# Patient Record
Sex: MALE | Race: WHITE | Employment: FULL TIME | URBAN - METROPOLITAN AREA
[De-identification: names, ages, dates, MRNs, and addresses within clinical notes are randomized per-mention and may not be internally consistent; named-entity substitution may affect disease eponyms.]

---

## 2022-12-02 RX ORDER — FLUTICASONE FUROATE AND VILANTEROL 200; 25 UG/1; UG/1
1 POWDER RESPIRATORY (INHALATION) DAILY
COMMUNITY

## 2022-12-02 RX ORDER — FUROSEMIDE 40 MG/1
40 TABLET ORAL DAILY
COMMUNITY

## 2022-12-02 RX ORDER — ROSUVASTATIN CALCIUM 20 MG/1
20 TABLET, COATED ORAL DAILY
COMMUNITY

## 2022-12-02 RX ORDER — SEMAGLUTIDE 2.68 MG/ML
2 INJECTION, SOLUTION SUBCUTANEOUS
COMMUNITY

## 2022-12-02 RX ORDER — OLMESARTAN MEDOXOMIL 40 MG/1
40 TABLET ORAL DAILY
COMMUNITY

## 2022-12-02 RX ORDER — ASPIRIN 81 MG/1
81 TABLET ORAL DAILY
COMMUNITY

## 2022-12-02 RX ORDER — PIOGLITAZONEHYDROCHLORIDE 30 MG/1
30 TABLET ORAL DAILY
COMMUNITY

## 2022-12-02 RX ORDER — AMLODIPINE BESYLATE 5 MG/1
5 TABLET ORAL DAILY
COMMUNITY

## 2022-12-02 NOTE — PERIOP NOTE
Patient verified name and . Order for consent not found in EHR. Type 1B surgery, PAT phone assessment complete. Orders not received. Labs per surgeon: no orders received. Labs per anesthesia protocol: K+ DOS- pt states he is unable to come prior to surgery for lab work. Patient answered medical/surgical history questions at their best of ability. All prior to admission medications documented in [facility]. Patient instructed to take the following medications the day of surgery according to anesthesia guidelines with a small sip of water: amlodipine and breo inhaler. On the day before surgery please take Acetaminophen 1000mg in the morning and then again before bed. You may substitute for Tylenol 650 mg. Hold all vitamins 7 days prior to surgery and NSAIDS 5 days prior to surgery. Patient instructed on the following:    > Arrive at A Entrance, time of arrival to be called the day before by 1700  > NPO after midnight, unless otherwise indicated, including gum, mints, and ice chips  > Responsible adult must drive patient to the hospital, stay during surgery, and patient will need supervision 24 hours after anesthesia  > Use antibacterial soap in shower the night before surgery and on the morning of surgery  > All piercings must be removed prior to arrival.    > Leave all valuables (money and jewelry) at home but bring insurance card and ID on DOS.   > You may be required to pay a deductible or co-pay on the day of your procedure. You can pre-pay by calling [phone] if your surgery is at the [facility] or [phone] if your surgery is at the [facility]. > Do not wear make-up, nail polish, lotions, cologne, perfumes, powders, or oil on skin. Artificial nails are not permitted.

## 2022-12-09 ENCOUNTER — ANESTHESIA (OUTPATIENT)
Dept: SURGERY | Age: 52
End: 2022-12-09
Payer: COMMERCIAL

## 2022-12-09 ENCOUNTER — HOSPITAL ENCOUNTER (OUTPATIENT)
Age: 52
Setting detail: OUTPATIENT SURGERY
Discharge: HOME | End: 2022-12-09
Attending: PODIATRIST | Admitting: PODIATRIST
Payer: COMMERCIAL

## 2022-12-09 ENCOUNTER — ANESTHESIA EVENT (OUTPATIENT)
Dept: SURGERY | Age: 52
End: 2022-12-09
Payer: COMMERCIAL

## 2022-12-09 ENCOUNTER — APPOINTMENT (OUTPATIENT)
Dept: GENERAL RADIOLOGY | Age: 52
End: 2022-12-09
Attending: PODIATRIST
Payer: COMMERCIAL

## 2022-12-09 VITALS
RESPIRATION RATE: 16 BRPM | TEMPERATURE: 99 F | BODY MASS INDEX: 44.51 KG/M2 | HEART RATE: 92 BPM | HEIGHT: 70 IN | WEIGHT: 310.9 LBS | OXYGEN SATURATION: 96 % | SYSTOLIC BLOOD PRESSURE: 110 MMHG | DIASTOLIC BLOOD PRESSURE: 59 MMHG

## 2022-12-09 LAB
GLUCOSE BLD STRIP.AUTO-MCNC: 91 MG/DL (ref 65–100)
POTASSIUM SERPL-SCNC: 4 MMOL/L (ref 3.5–5.1)
SERVICE CMNT-IMP: NORMAL

## 2022-12-09 PROCEDURE — 7100000010 HC PHASE II RECOVERY - FIRST 15 MIN: Performed by: PODIATRIST

## 2022-12-09 PROCEDURE — 6360000002 HC RX W HCPCS: Performed by: NURSE ANESTHETIST, CERTIFIED REGISTERED

## 2022-12-09 PROCEDURE — 3700000000 HC ANESTHESIA ATTENDED CARE: Performed by: PODIATRIST

## 2022-12-09 PROCEDURE — 2500000003 HC RX 250 WO HCPCS: Performed by: NURSE ANESTHETIST, CERTIFIED REGISTERED

## 2022-12-09 PROCEDURE — 7100000011 HC PHASE II RECOVERY - ADDTL 15 MIN: Performed by: PODIATRIST

## 2022-12-09 PROCEDURE — 7100000000 HC PACU RECOVERY - FIRST 15 MIN: Performed by: PODIATRIST

## 2022-12-09 PROCEDURE — 2500000003 HC RX 250 WO HCPCS: Performed by: PODIATRIST

## 2022-12-09 PROCEDURE — 3600000004 HC SURGERY LEVEL 4 BASE: Performed by: PODIATRIST

## 2022-12-09 PROCEDURE — 2580000003 HC RX 258: Performed by: NURSE ANESTHETIST, CERTIFIED REGISTERED

## 2022-12-09 PROCEDURE — 2709999900 HC NON-CHARGEABLE SUPPLY: Performed by: PODIATRIST

## 2022-12-09 PROCEDURE — 6370000000 HC RX 637 (ALT 250 FOR IP): Performed by: ANESTHESIOLOGY

## 2022-12-09 PROCEDURE — 3600000014 HC SURGERY LEVEL 4 ADDTL 15MIN: Performed by: PODIATRIST

## 2022-12-09 PROCEDURE — 6360000002 HC RX W HCPCS: Performed by: PODIATRIST

## 2022-12-09 PROCEDURE — 73620 X-RAY EXAM OF FOOT: CPT

## 2022-12-09 PROCEDURE — 84132 ASSAY OF SERUM POTASSIUM: CPT

## 2022-12-09 PROCEDURE — 82962 GLUCOSE BLOOD TEST: CPT

## 2022-12-09 PROCEDURE — C1713 ANCHOR/SCREW BN/BN,TIS/BN: HCPCS | Performed by: PODIATRIST

## 2022-12-09 PROCEDURE — 2720000010 HC SURG SUPPLY STERILE: Performed by: PODIATRIST

## 2022-12-09 PROCEDURE — 7100000001 HC PACU RECOVERY - ADDTL 15 MIN: Performed by: PODIATRIST

## 2022-12-09 PROCEDURE — 3700000001 HC ADD 15 MINUTES (ANESTHESIA): Performed by: PODIATRIST

## 2022-12-09 RX ORDER — PROPOFOL 10 MG/ML
INJECTION, EMULSION INTRAVENOUS PRN
Status: DISCONTINUED | OUTPATIENT
Start: 2022-12-09 | End: 2022-12-09 | Stop reason: SDUPTHER

## 2022-12-09 RX ORDER — SODIUM CHLORIDE, SODIUM LACTATE, POTASSIUM CHLORIDE, CALCIUM CHLORIDE 600; 310; 30; 20 MG/100ML; MG/100ML; MG/100ML; MG/100ML
INJECTION, SOLUTION INTRAVENOUS CONTINUOUS
Status: DISCONTINUED | OUTPATIENT
Start: 2022-12-09 | End: 2022-12-09 | Stop reason: HOSPADM

## 2022-12-09 RX ORDER — EPHEDRINE SULFATE/0.9% NACL/PF 50 MG/5 ML
SYRINGE (ML) INTRAVENOUS PRN
Status: DISCONTINUED | OUTPATIENT
Start: 2022-12-09 | End: 2022-12-09 | Stop reason: SDUPTHER

## 2022-12-09 RX ORDER — SODIUM CHLORIDE 9 MG/ML
INJECTION, SOLUTION INTRAVENOUS PRN
Status: DISCONTINUED | OUTPATIENT
Start: 2022-12-09 | End: 2022-12-09 | Stop reason: HOSPADM

## 2022-12-09 RX ORDER — OXYCODONE HYDROCHLORIDE 5 MG/1
5 TABLET ORAL
Status: DISCONTINUED | OUTPATIENT
Start: 2022-12-09 | End: 2022-12-09 | Stop reason: HOSPADM

## 2022-12-09 RX ORDER — SODIUM CHLORIDE 0.9 % (FLUSH) 0.9 %
5-40 SYRINGE (ML) INJECTION PRN
Status: DISCONTINUED | OUTPATIENT
Start: 2022-12-09 | End: 2022-12-09 | Stop reason: HOSPADM

## 2022-12-09 RX ORDER — DEXAMETHASONE SODIUM PHOSPHATE 10 MG/ML
INJECTION INTRAMUSCULAR; INTRAVENOUS PRN
Status: DISCONTINUED | OUTPATIENT
Start: 2022-12-09 | End: 2022-12-09 | Stop reason: SDUPTHER

## 2022-12-09 RX ORDER — OXYCODONE HYDROCHLORIDE 5 MG/1
5 TABLET ORAL ONCE
Status: COMPLETED | OUTPATIENT
Start: 2022-12-09 | End: 2022-12-09

## 2022-12-09 RX ORDER — LIDOCAINE HYDROCHLORIDE 20 MG/ML
INJECTION, SOLUTION EPIDURAL; INFILTRATION; INTRACAUDAL; PERINEURAL PRN
Status: DISCONTINUED | OUTPATIENT
Start: 2022-12-09 | End: 2022-12-09 | Stop reason: SDUPTHER

## 2022-12-09 RX ORDER — ROCURONIUM BROMIDE 10 MG/ML
INJECTION, SOLUTION INTRAVENOUS PRN
Status: DISCONTINUED | OUTPATIENT
Start: 2022-12-09 | End: 2022-12-09 | Stop reason: SDUPTHER

## 2022-12-09 RX ORDER — SUCCINYLCHOLINE/SOD CL,ISO/PF 200MG/10ML
SYRINGE (ML) INTRAVENOUS PRN
Status: DISCONTINUED | OUTPATIENT
Start: 2022-12-09 | End: 2022-12-09 | Stop reason: SDUPTHER

## 2022-12-09 RX ORDER — FENTANYL CITRATE 50 UG/ML
INJECTION, SOLUTION INTRAMUSCULAR; INTRAVENOUS PRN
Status: DISCONTINUED | OUTPATIENT
Start: 2022-12-09 | End: 2022-12-09 | Stop reason: SDUPTHER

## 2022-12-09 RX ORDER — GLYCOPYRROLATE 0.2 MG/ML
INJECTION INTRAMUSCULAR; INTRAVENOUS PRN
Status: DISCONTINUED | OUTPATIENT
Start: 2022-12-09 | End: 2022-12-09 | Stop reason: SDUPTHER

## 2022-12-09 RX ORDER — ONDANSETRON 2 MG/ML
4 INJECTION INTRAMUSCULAR; INTRAVENOUS
Status: DISCONTINUED | OUTPATIENT
Start: 2022-12-09 | End: 2022-12-09 | Stop reason: HOSPADM

## 2022-12-09 RX ORDER — SODIUM CHLORIDE, SODIUM LACTATE, POTASSIUM CHLORIDE, CALCIUM CHLORIDE 600; 310; 30; 20 MG/100ML; MG/100ML; MG/100ML; MG/100ML
INJECTION, SOLUTION INTRAVENOUS CONTINUOUS PRN
Status: DISCONTINUED | OUTPATIENT
Start: 2022-12-09 | End: 2022-12-09 | Stop reason: SDUPTHER

## 2022-12-09 RX ORDER — HYDROMORPHONE HYDROCHLORIDE 1 MG/ML
0.25 INJECTION, SOLUTION INTRAMUSCULAR; INTRAVENOUS; SUBCUTANEOUS EVERY 5 MIN PRN
Status: DISCONTINUED | OUTPATIENT
Start: 2022-12-09 | End: 2022-12-09 | Stop reason: HOSPADM

## 2022-12-09 RX ORDER — DEXTROSE MONOHYDRATE 100 MG/ML
INJECTION, SOLUTION INTRAVENOUS CONTINUOUS PRN
Status: DISCONTINUED | OUTPATIENT
Start: 2022-12-09 | End: 2022-12-09 | Stop reason: HOSPADM

## 2022-12-09 RX ORDER — HALOPERIDOL 5 MG/ML
1 INJECTION INTRAMUSCULAR
Status: DISCONTINUED | OUTPATIENT
Start: 2022-12-09 | End: 2022-12-09 | Stop reason: HOSPADM

## 2022-12-09 RX ORDER — SODIUM CHLORIDE 0.9 % (FLUSH) 0.9 %
5-40 SYRINGE (ML) INJECTION EVERY 12 HOURS SCHEDULED
Status: DISCONTINUED | OUTPATIENT
Start: 2022-12-09 | End: 2022-12-09 | Stop reason: HOSPADM

## 2022-12-09 RX ORDER — BUPIVACAINE HYDROCHLORIDE 5 MG/ML
INJECTION, SOLUTION EPIDURAL; INTRACAUDAL PRN
Status: DISCONTINUED | OUTPATIENT
Start: 2022-12-09 | End: 2022-12-09 | Stop reason: HOSPADM

## 2022-12-09 RX ORDER — ONDANSETRON 2 MG/ML
INJECTION INTRAMUSCULAR; INTRAVENOUS PRN
Status: DISCONTINUED | OUTPATIENT
Start: 2022-12-09 | End: 2022-12-09 | Stop reason: SDUPTHER

## 2022-12-09 RX ADMIN — ONDANSETRON 4 MG: 2 INJECTION INTRAMUSCULAR; INTRAVENOUS at 16:11

## 2022-12-09 RX ADMIN — FENTANYL CITRATE 25 MCG: 50 INJECTION, SOLUTION INTRAMUSCULAR; INTRAVENOUS at 16:17

## 2022-12-09 RX ADMIN — ROCURONIUM BROMIDE 6 MG: 50 INJECTION, SOLUTION INTRAVENOUS at 15:46

## 2022-12-09 RX ADMIN — Medication 10 MG: at 16:04

## 2022-12-09 RX ADMIN — OXYCODONE 5 MG: 5 TABLET ORAL at 18:11

## 2022-12-09 RX ADMIN — GLYCOPYRROLATE 0.1 MG: 0.2 INJECTION, SOLUTION INTRAMUSCULAR; INTRAVENOUS at 16:11

## 2022-12-09 RX ADMIN — PHENYLEPHRINE HYDROCHLORIDE 100 MCG: 0.1 INJECTION, SOLUTION INTRAVENOUS at 16:29

## 2022-12-09 RX ADMIN — PHENYLEPHRINE HYDROCHLORIDE 100 MCG: 0.1 INJECTION, SOLUTION INTRAVENOUS at 16:00

## 2022-12-09 RX ADMIN — PHENYLEPHRINE HYDROCHLORIDE 100 MCG: 0.1 INJECTION, SOLUTION INTRAVENOUS at 16:04

## 2022-12-09 RX ADMIN — FENTANYL CITRATE 50 MCG: 50 INJECTION, SOLUTION INTRAMUSCULAR; INTRAVENOUS at 15:45

## 2022-12-09 RX ADMIN — PHENYLEPHRINE HYDROCHLORIDE 100 MCG: 0.1 INJECTION, SOLUTION INTRAVENOUS at 17:05

## 2022-12-09 RX ADMIN — PHENYLEPHRINE HYDROCHLORIDE 100 MCG: 0.1 INJECTION, SOLUTION INTRAVENOUS at 16:18

## 2022-12-09 RX ADMIN — PHENYLEPHRINE HYDROCHLORIDE 50 MCG: 0.1 INJECTION, SOLUTION INTRAVENOUS at 16:20

## 2022-12-09 RX ADMIN — Medication 10 MG: at 16:29

## 2022-12-09 RX ADMIN — Medication 200 MG: at 15:47

## 2022-12-09 RX ADMIN — Medication 5 MG: at 16:20

## 2022-12-09 RX ADMIN — SODIUM CHLORIDE, SODIUM LACTATE, POTASSIUM CHLORIDE, AND CALCIUM CHLORIDE: 600; 310; 30; 20 INJECTION, SOLUTION INTRAVENOUS at 15:30

## 2022-12-09 RX ADMIN — PHENYLEPHRINE HYDROCHLORIDE 50 MCG: 0.1 INJECTION, SOLUTION INTRAVENOUS at 16:23

## 2022-12-09 RX ADMIN — DEXAMETHASONE SODIUM PHOSPHATE 10 MG: 10 INJECTION INTRAMUSCULAR; INTRAVENOUS at 16:11

## 2022-12-09 RX ADMIN — PROPOFOL 200 MG: 10 INJECTION, EMULSION INTRAVENOUS at 15:46

## 2022-12-09 RX ADMIN — Medication 3000 MG: at 16:04

## 2022-12-09 RX ADMIN — LIDOCAINE HYDROCHLORIDE 100 MG: 20 INJECTION, SOLUTION EPIDURAL; INFILTRATION; INTRACAUDAL; PERINEURAL at 15:46

## 2022-12-09 RX ADMIN — PHENYLEPHRINE HYDROCHLORIDE 100 MCG: 0.1 INJECTION, SOLUTION INTRAVENOUS at 16:35

## 2022-12-09 RX ADMIN — SODIUM CHLORIDE, SODIUM LACTATE, POTASSIUM CHLORIDE, AND CALCIUM CHLORIDE: 600; 310; 30; 20 INJECTION, SOLUTION INTRAVENOUS at 17:30

## 2022-12-09 RX ADMIN — PHENYLEPHRINE HYDROCHLORIDE 100 MCG: 0.1 INJECTION, SOLUTION INTRAVENOUS at 16:45

## 2022-12-09 RX ADMIN — Medication 5 MG: at 16:18

## 2022-12-09 RX ADMIN — Medication 10 MG: at 16:35

## 2022-12-09 RX ADMIN — PHENYLEPHRINE HYDROCHLORIDE 100 MCG: 0.1 INJECTION, SOLUTION INTRAVENOUS at 17:15

## 2022-12-09 RX ADMIN — Medication 10 MG: at 16:23

## 2022-12-09 RX ADMIN — SODIUM CHLORIDE, SODIUM LACTATE, POTASSIUM CHLORIDE, AND CALCIUM CHLORIDE: 600; 310; 30; 20 INJECTION, SOLUTION INTRAVENOUS at 16:29

## 2022-12-09 ASSESSMENT — PAIN DESCRIPTION - DESCRIPTORS: DESCRIPTORS: ACHING

## 2022-12-09 ASSESSMENT — PAIN DESCRIPTION - PAIN TYPE: TYPE: SURGICAL PAIN

## 2022-12-09 ASSESSMENT — PAIN SCALES - GENERAL
PAINLEVEL_OUTOF10: 4

## 2022-12-09 ASSESSMENT — PAIN DESCRIPTION - ORIENTATION: ORIENTATION: LEFT

## 2022-12-09 ASSESSMENT — PAIN - FUNCTIONAL ASSESSMENT: PAIN_FUNCTIONAL_ASSESSMENT: 0-10

## 2022-12-09 ASSESSMENT — PAIN DESCRIPTION - LOCATION: LOCATION: LEG

## 2022-12-09 ASSESSMENT — PAIN DESCRIPTION - FREQUENCY: FREQUENCY: CONTINUOUS

## 2022-12-09 NOTE — ANESTHESIA PRE PROCEDURE
Department of Anesthesiology  Preprocedure Note       Name:  Korey Coronado   Age:  50 y.o.  :  1970                                          MRN:  [id number]         Date:  2022      Surgeon: Marycruz Young):  Jeri Russell DPM    Procedure: Procedure(s):  PERONEAL TENDON REPAIR/ RESECTION OF EXOSTOSIS OF METATARSAL/ TARSAL BONE. ** PERONEAL TENDON REPAIR, 5TH METATARSAL OSTEOTOMY/ LEFT    Medications prior to admission:   Prior to Admission medications    Medication Sig Start Date End Date Taking? Authorizing Provider   amLODIPine (NORVASC) 5 MG tablet Take 5 mg by mouth daily   Yes Historical Provider, MD   olmesartan (BENICAR) 40 MG tablet Take 40 mg by mouth daily   Yes Historical Provider, MD   fluticasone furoate-vilanterol (BREO ELLIPTA) 200-25 MCG/ACT AEPB inhaler Inhale 1 puff into the lungs daily   Yes Historical Provider, MD   pioglitazone (ACTOS) 30 MG tablet Take 30 mg by mouth daily   Yes Historical Provider, MD   empagliflozin (JARDIANCE) 25 MG tablet Take 25 mg by mouth daily   Yes Historical Provider, MD   furosemide (LASIX) 40 MG tablet Take 40 mg by mouth daily   Yes Historical Provider, MD   rosuvastatin (CRESTOR) 20 MG tablet Take 20 mg by mouth daily   Yes Historical Provider, MD   Semaglutide, 2 MG/DOSE, (OZEMPIC, 2 MG/DOSE,) 8 MG/3ML SOPN Inject 2 mg into the skin every 7 days   Yes Historical Provider, MD   aspirin 81 MG EC tablet Take 81 mg by mouth daily  Patient not taking: Reported on 2022   Yes Historical Provider, MD   Multiple Vitamin (MULTIVITAMIN ADULT PO) Take by mouth daily   Yes Historical Provider, MD       Current medications:    Current Facility-Administered Medications   Medication Dose Route Frequency Provider Last Rate Last Admin    ceFAZolin (ANCEF) 3000 mg in sterile water 30 mL IV syringe  3,000 mg IntraVENous Once Jeri Russell DPM           Allergies:     Allergies   Allergen Reactions    Ibuprofen Anaphylaxis    Flexeril [Cyclobenzaprine] Other (See Comments)     Personality changes        Problem List:  There is no problem list on file for this patient. Past Medical History:        Diagnosis Date    Asthma     Daily inhaler    Diabetes ([address]     Type 2, oral meds and weekly shot, Average BS , Denies Hypo, last A1C 7.0 on 22    GERD (gastroesophageal reflux disease)     OTC meds as needed    Hypertension     Managed  with meds       Past Surgical History:        Procedure Laterality Date    CERVICAL FUSION      SHOULDER ARTHROSCOPY Left     TONSILLECTOMY         Social History:    Social History     Tobacco Use    Smoking status: Former     Years: 8.00     Types: Cigarettes     Quit date: 1990     Years since quittin.9    Smokeless tobacco: Former     Quit date: 1990   Substance Use Topics    Alcohol use: Yes     Comment: occas                                Counseling given: Not Answered      Vital Signs (Current):   Vitals:    22 0952 22 1356   BP:  121/87   Pulse:  73   Resp:  18   Temp:  97.4 °F (36.3 °C)   TempSrc:  Temporal   SpO2:  99%   Weight: 300 lb (136.1 kg) (!) 310 lb 14.4 oz (141 kg)   Height: 5' 10\" (1.778 m)                                               BP Readings from Last 3 Encounters:   22 121/87       NPO Status: Time of last liquid consumption:                         Time of last solid consumption:                         Date of last liquid consumption: 22                        Date of last solid food consumption: 22    BMI:   Wt Readings from Last 3 Encounters:   22 (!) 310 lb 14.4 oz (141 kg)     Body mass index is 44.61 kg/m².     CBC: No results found for: WBC, RBC, HGB, HCT, MCV, RDW, PLT    CMP: No results found for: NA, K, CL, CO2, BUN, CREATININE, GFRAA, AGRATIO, LABGLOM, GLUCOSE, GLU, PROT, CALCIUM, BILITOT, ALKPHOS, AST, ALT    POC Tests: No results for input(s): POCGLU, POCNA, POCK, POCCL, POCBUN, POCHEMO, POCHCT in the last 72 hours.    Coags: No results found for: PROTIME, INR, APTT    HCG (If Applicable): No results found for: PREGTESTUR, PREGSERUM, HCG, HCGQUANT     ABGs: No results found for: PHART, PO2ART, XYE3YTL, KGN3JNS, BEART, S1HIRJKG     Type & Screen (If Applicable):  No results found for: LABABO, LABRH    Drug/Infectious Status (If Applicable):  No results found for: HIV, HEPCAB    COVID-19 Screening (If Applicable): No results found for: COVID19        Anesthesia Evaluation  Patient summary reviewed and Nursing notes reviewed  Airway: Mallampati: II          Dental: normal exam         Pulmonary:normal exam  breath sounds clear to auscultation  (+) asthma:                            Cardiovascular:    (+) hypertension:,         Rhythm: regular  Rate: normal                    Neuro/Psych:   Negative Neuro/Psych ROS              GI/Hepatic/Renal:   (+) GERD: well controlled, morbid obesity          Endo/Other:    (+) Diabetes, . Abdominal:   (+) obese,           Vascular: negative vascular ROS. Other Findings:           Anesthesia Plan      general     ASA 3       Induction: intravenous. MIPS: Postoperative opioids intended and Prophylactic antiemetics administered. Anesthetic plan and risks discussed with patient and mother.                         Rock Lai DO   12/9/2022

## 2022-12-09 NOTE — PERIOP NOTE
MD notified that there were no orders entered for PACU for this patient and that patient needs an oral pain medication prior to d/c. New orders received for pain medication.

## 2022-12-09 NOTE — BRIEF OP NOTE
Brief Postoperative Note      Patient: Priscila Crawford  YOB: 1970  MRN: [id number]    Date of Procedure: 12/9/2022    Pre-Op Diagnosis: Peroneal tendinitis, left [M76.72]  Exostosis of left foot [M89.8X7]    Post-Op Diagnosis: Same       Procedure(s):  PERONEAL TENDON REPAIR/ RESECTION OF EXOSTOSIS OF METATARSAL/ TARSAL BONE. ** PERONEAL TENDON REPAIR, 5TH METATARSAL OSTEOTOMY/ LEFT    Surgeon(s):  Rebeka Cole DPM    Assistant:  * No surgical staff found *    Anesthesia: General    Estimated Blood Loss (mL): less than 817     Complications: None    Specimens:   * No specimens in log *    Implants:  * No implants in log *      Drains: * No LDAs found *    Findings: Mild tear of the distal peroneus brevis tendon which was excised, removal of exostosis of the lateral 5th metatarsal .    Electronically signed by Rebeka Cole DPM on 12/9/2022 at 5:43 PM

## 2022-12-09 NOTE — PERIOP NOTE
Patient arrived to PACU with a 20 gauge IV in his right hand posteriorly; fluids infusing; no abnormalities noted. At 1813, IV is removed; no redness, swelling noted; catheter intact; IV removed without complications and gauze applied with tape to the IV site.

## 2022-12-09 NOTE — DISCHARGE INSTRUCTIONS
-Elevate and ice the left lower extremity for pain and swelling control.  -Take medications as prescribed. -Keep the dressing to the left foot clean, dry and intact. -No weight bearing to the left lower extremity.  -Please call the office of Dr. Srini Reis with any questions or concerns. After general anesthesia or intravenous sedation, for 24 hours or while taking prescription Narcotics:  Limit your activities  A responsible adult needs to be with you for the next 24 hours  Do not drive and operate hazardous machinery  Do not make important personal or business decisions  Do not drink alcoholic beverages  If you have not urinated within 8 hours after discharge, and you are experiencing discomfort from urinary retention, please go to the nearest ED. If you have sleep apnea and have a CPAP machine, please use it for all naps and sleeping. Please use caution when taking narcotics and any of your home medications that may cause drowsiness. *  Please give a list of your current medications to your Primary Care Provider. *  Please update this list whenever your medications are discontinued, doses are      changed, or new medications (including over-the-counter products) are added. *  Please carry medication information at all times in case of emergency situations. These are general instructions for a healthy lifestyle:  No smoking/ No tobacco products/ Avoid exposure to second hand smoke  Surgeon General's Warning:  Quitting smoking now greatly reduces serious risk to your health.   Obesity, smoking, and sedentary lifestyle greatly increases your risk for illness  A healthy diet, regular physical exercise & weight monitoring are important for maintaining a healthy lifestyle    You may be retaining fluid if you have a history of heart failure or if you experience any of the following symptoms:  Weight gain of 3 pounds or more overnight or 5 pounds in a week, increased swelling in our hands or feet or shortness of breath while lying flat in bed. Please call your doctor as soon as you notice any of these symptoms; do not wait until your next office visit.

## 2022-12-09 NOTE — H&P
Department of Anesthesiology  History and Physical                                        Name:  Dania Mendenhall      Age:  50 y.o.  :  1970                                               MRN:  [id number]                                                                      Date:  2022              Surgeon: Jennifer Castillo):  Janeen Kamara DPM     Procedure: Procedure(s):  PERONEAL TENDON REPAIR/ RESECTION OF EXOSTOSIS OF METATARSAL/ TARSAL BONE. ** PERONEAL TENDON REPAIR, 5TH METATARSAL OSTEOTOMY/ LEFT     Medications prior to admission:   Home Medications           Prior to Admission medications    Medication Sig Start Date End Date Taking?  Authorizing Provider   amLODIPine (NORVASC) 5 MG tablet Take 5 mg by mouth daily     Yes Historical Provider, MD   olmesartan (BENICAR) 40 MG tablet Take 40 mg by mouth daily     Yes Historical Provider, MD   fluticasone furoate-vilanterol (BREO ELLIPTA) 200-25 MCG/ACT AEPB inhaler Inhale 1 puff into the lungs daily     Yes Historical Provider, MD   pioglitazone (ACTOS) 30 MG tablet Take 30 mg by mouth daily     Yes Historical Provider, MD   empagliflozin (JARDIANCE) 25 MG tablet Take 25 mg by mouth daily     Yes Historical Provider, MD   furosemide (LASIX) 40 MG tablet Take 40 mg by mouth daily     Yes Historical Provider, MD   rosuvastatin (CRESTOR) 20 MG tablet Take 20 mg by mouth daily     Yes Historical Provider, MD   Semaglutide, 2 MG/DOSE, (OZEMPIC, 2 MG/DOSE,) 8 MG/3ML SOPN Inject 2 mg into the skin every 7 days     Yes Historical Provider, MD   aspirin 81 MG EC tablet Take 81 mg by mouth daily  Patient not taking: Reported on 2022     Yes Historical Provider, MD   Multiple Vitamin (MULTIVITAMIN ADULT PO) Take by mouth daily     Yes Historical Provider, MD            Current medications:    Current Facility-Administered Medications             Current Facility-Administered Medications   Medication Dose Route Frequency Provider Last Rate Last Admin ceFAZolin (ANCEF) 3000 mg in sterile water 30 mL IV syringe  3,000 mg IntraVENous Once Levester Poplin, DPM                Allergies: Allergies   Allergen Reactions    Ibuprofen Anaphylaxis    Flexeril [Cyclobenzaprine] Other (See Comments)       Personality changes          Problem List:  There is no problem list on file for this patient.         Past Medical History:    Past Medical History             Diagnosis Date    Asthma       Daily inhaler    Diabetes ([address]       Type 2, oral meds and weekly shot, Average BS , Denies Hypo, last A1C 7.0 on 22    GERD (gastroesophageal reflux disease)       OTC meds as needed    Hypertension       Managed  with meds            Past Surgical History:    Past Surgical History             Procedure Laterality Date    CERVICAL FUSION        SHOULDER ARTHROSCOPY Left      TONSILLECTOMY                Social History:    Social History            Tobacco Use    Smoking status: Former       Years: 8.00       Types: Cigarettes       Quit date: 1990       Years since quittin.9    Smokeless tobacco: Former       Quit date: 1990   Substance Use Topics    Alcohol use: Yes       Comment: occas                                 Counseling given: Not Answered        Vital Signs (Current):   Vitals        Vitals:     22 0952 22 1356   BP:   121/87   Pulse:   73   Resp:   18   Temp:   97.4 °F (36.3 °C)   TempSrc:   Temporal   SpO2:   99%   Weight: 300 lb (136.1 kg) (!) 310 lb 14.4 oz (141 kg)   Height: 5' 10\" (1.778 m)                                                       BP Readings from Last 3 Encounters:   22 121/87         NPO Status: Time of last liquid consumption: 2300                        Time of last solid consumption:                         Date of last liquid consumption: 22                        Date of last solid food consumption: 22     BMI:       Wt Readings from Last 3 Encounters:   22 (!) 310 lb 14.4 oz (141 kg)     Body mass index is 44.61 kg/m². CBC: No results found for: WBC, RBC, HGB, HCT, MCV, RDW, PLT     CMP: No results found for: NA, K, CL, CO2, BUN, CREATININE, GFRAA, AGRATIO, LABGLOM, GLUCOSE, GLU, PROT, CALCIUM, BILITOT, ALKPHOS, AST, ALT     POC Tests: No results for input(s): POCGLU, POCNA, POCK, POCCL, POCBUN, POCHEMO, POCHCT in the last 72 hours. Coags: No results found for: PROTIME, INR, APTT     HCG (If Applicable): No results found for: PREGTESTUR, PREGSERUM, HCG, HCGQUANT      ABGs: No results found for: PHART, PO2ART, IRA4PSS, QXN9WJK, BEART, Y9HQCLJM      Type & Screen (If Applicable):  No results found for: LABABO, LABRH     Drug/Infectious Status (If Applicable):  No results found for: HIV, HEPCAB     COVID-19 Screening (If Applicable): No results found for: COVID19      ROS:  Negative except for indicated as below     Anesthesia Evaluation  Patient summary reviewed and Nursing notes reviewed  Airway: Mallampati: II             Dental: normal exam          Pulmonary:normal exam  breath sounds clear to auscultation  (+) asthma:                                         Cardiovascular:     (+) hypertension:,            Rhythm: regular  Rate: normal                           Neuro/Psych:   Negative Neuro/Psych ROS                GI/Hepatic/Renal:   (+) GERD: well controlled, morbid obesity             Endo/Other:    (+) Diabetes, . Abdominal:   (+) obese,            Vascular: negative vascular ROS. Other Findings:             Anesthesia Plan        general      ASA 3         Induction: intravenous. MIPS: Postoperative opioids intended and Prophylactic antiemetics administered. Anesthetic plan and risks discussed with patient and mother.                                    Carolyn Lora DO   12/9/2022

## 2022-12-10 NOTE — ANESTHESIA POSTPROCEDURE EVALUATION
Department of Anesthesiology  Postprocedure Note    Patient: Silverio Rodrigez  MRN: [id number]  YOB: 1970  Date of evaluation: 12/9/2022      Procedure Summary     Date: 12/09/22 Room / Location: [facility] MAIN OR  / [facility] MAIN OR    Anesthesia Start: 2980 Anesthesia Stop: 1748    Procedure: PERONEAL TENDON REPAIR/ RESECTION OF EXOSTOSIS OF METATARSAL/ TARSAL BONE. ** PERONEAL TENDON REPAIR, 5TH METATARSAL OSTEOTOMY/ LEFT (Left: Foot) Diagnosis:       Peroneal tendinitis, left      Exostosis of left foot      (Peroneal tendinitis, left [M76.72])      (Exostosis of left foot [M89.8X7])    Surgeons: Sandra Guerra DPM Responsible Provider: Yonatan Lau MD    Anesthesia Type: General ASA Status: 3          Anesthesia Type: General    Migel Phase I: Migel Score: 8    Migel Phase II: Migel Score: 10      Anesthesia Post Evaluation    Patient location during evaluation: PACU  Patient participation: complete - patient participated  Level of consciousness: awake and alert  Airway patency: patent  Nausea: well controlled. Complications: no  Cardiovascular status: acceptable.   Respiratory status: acceptable  Hydration status: stable

## 2022-12-10 NOTE — OP NOTE
Operative Note      Patient: Jose Cerrato  YOB: 1970  MRN: [id number]    Date of Procedure: 12/9/2022    Pre-Op Diagnosis: Peroneal tendinitis, left [M76.72]  Exostosis of left foot [M89.8X7]    Post-Op Diagnosis: Same       Procedure(s):  PERONEAL TENDON REPAIR/ RESECTION OF EXOSTOSIS OF METATARSAL/ TARSAL BONE. ** PERONEAL TENDON REPAIR, 5TH METATARSAL OSTEOTOMY/ LEFT    Surgeon(s):  Jasmyne Pollack DPM    Assistant:   * No surgical staff found *    Anesthesia: General    Estimated Blood Loss (mL): less than 096     Complications: None    Specimens:   * No specimens in log *    Implants:  * No implants in log *      Drains: * No LDAs found *    Findings: Hypertrophic 5th metatarsal base with split tear of the distal peroneus brevis tendon    Detailed Description of Procedure: On this day and time with informed consent signed and witnessed patient was removed from the pre-operative holding area and taken to the operating theater where he was placed on the operating table in normal supine position. Once general anesthesia was induced an ankle block was performed with 20 cc of 1% lidocaine plain and 0.5% marcaine plain followed by a well padded mid-calf tourniquet over the left lower extremity. The patient was then placed in lateral decubitus position and was then prepped and draped using normal sterile technique the tourniquet was inflated to 250 mmHg and the procedure was begun as dictated below. Of note the tourniquet was inflated for a total of 70 minutes. A longitudinal incision was mapped out and made with a #15 blade over the base of the 5th metatarsal and the distal peroneus brevis. The incision was carried down to the level of the deep fascia with blunt dissection the periosteum was reflected off the base of the metatarsal using the #15 blade.  The insertion of the peroneus brevis was evaluated and the plantar exostosis was removed from the base of the 5th metatarsal using an oscillating rasp. The 5th metatarsal was evaluated using intraoperative fluoroscopy and exostosis was noted to be removed intraoperatively and through fluoroscopy. The peroneal tendon was then evaluated and a split tear of the distal tendon was noted and sharply excised using a number 15 blade as it comprised less than 15% of the tendon cross-sectional area. The surgical site was flushed with copious amounts of sterile saline. The tendon and base of the 5th metatarsal was again evaluated and no further deficits were noted at this time. The periosteum and tendon sheath were closed with 0 vicryl followed by closure of the deep fascia using 3.0 vicryl followed by 4.0 vicryl to close to the subcutaneous tissue and 4.0 nylon to close the skin. The tourniquet was deflated, bleeding was well controlled and blood flow return to the digits and foot as noted with a palpable DP and PT pulse with CFT < 3 seconds. Xeroform was placed over the surgical wound followed by a dry sterile dressing and posterior splint. Once the post operative dressings were in place the patient was discharged from the operating theater to the post operative care unit where he will be discharged home with the following instructions; Elevate and ice the left lower extremity for pain and swelling control. Take medications as prescribed. Keep dressings clean, dry and intact. No weight bearing to the left lower extremity. Please call the office of Dr. Adam James with any questions or concerns.      Electronically signed by David Joe DPM on 12/10/2022 at 12:14 PM

## (undated) DEVICE — DRESSING,GAUZE,XEROFORM,CURAD,1"X8",ST: Brand: CURAD

## (undated) DEVICE — PADDING,UNDERCAST,COTTON, 4"X4YD STERILE: Brand: MEDLINE

## (undated) DEVICE — BANDAGE,GAUZE,BULKEE II,4.5"X4.1YD,STRL: Brand: MEDLINE

## (undated) DEVICE — SUTURE ETHLN 4-0 L18IN NONABSORBABLE UD PS-2 L19MM 3/8 CIR 1611G

## (undated) DEVICE — PADDING CAST W4INXL4YD ST COT COHESIVE HND TEARABLE SPEC

## (undated) DEVICE — BNDG,ELSTC,MATRIX,STRL,6"X5YD,LF,HOOK&LP: Brand: MEDLINE

## (undated) DEVICE — SUTURE VCRL SZ 3-0 L27IN ABSRB UD L19MM PS-2 3/8 CIR PRIM J427H

## (undated) DEVICE — ZIMMER® STERILE DISPOSABLE TOURNIQUET CUFF WITH PLC, DUAL PORT, SINGLE BLADDER, 24 IN. (61 CM)

## (undated) DEVICE — SPLINT CAST W4XL30IN WHT THMB FBRGLS PRECUT INTLOK WRINKLE

## (undated) DEVICE — NEEDLE HYPO 23GA L1IN TURQ S STL HUB POLYPR SHLD REG BVL

## (undated) DEVICE — SUTURE ETHLN SZ 3-0 L18IN NONABSORBABLE BLK PS-2 L19MM 3/8 1669H

## (undated) DEVICE — GLOVE SURG SZ 8 L12IN FNGR THK79MIL GRN LTX FREE

## (undated) DEVICE — FOOT & ANKLE SOFT DR WOMACK: Brand: MEDLINE INDUSTRIES, INC.

## (undated) DEVICE — SUTURE VCRL SZ 4-0 L18IN ABSRB UD L19MM PS-2 3/8 CIR PRIM J496H

## (undated) DEVICE — DRAPE C ARM W54XL84IN MINI FOR OEC 6800

## (undated) DEVICE — GLOVE ORANGE PI 8   MSG9080

## (undated) DEVICE — PADDING CAST W6INXL4YD COT LO LINTING WYTEX

## (undated) DEVICE — WIRE ORTH SMOOTH SGL SHRP TIP TRCR PLN S STL ST 16MM DIA
Type: IMPLANTABLE DEVICE | Site: FOOT | Status: NON-FUNCTIONAL
Removed: 2022-12-09

## (undated) DEVICE — BNDG,ELSTC,MATRIX,STRL,4"X5YD,LF,HOOK&LP: Brand: MEDLINE

## (undated) DEVICE — LARGE TEAR CROSS CUT RASP (14.0 X 7.0MM)